# Patient Record
Sex: FEMALE | Race: WHITE
[De-identification: names, ages, dates, MRNs, and addresses within clinical notes are randomized per-mention and may not be internally consistent; named-entity substitution may affect disease eponyms.]

---

## 2017-04-27 ENCOUNTER — HOSPITAL ENCOUNTER (INPATIENT)
Dept: HOSPITAL 4 - SED | Age: 57
LOS: 1 days | Discharge: ANOTHER HOSPITAL | DRG: 53 | End: 2017-04-28
Payer: MEDICAID

## 2017-04-27 VITALS
OXYGEN SATURATION: 99 % | SYSTOLIC BLOOD PRESSURE: 158 MMHG | BODY MASS INDEX: 20.89 KG/M2 | RESPIRATION RATE: 17 BRPM | HEART RATE: 100 BPM | TEMPERATURE: 97.9 F | HEIGHT: 66 IN | DIASTOLIC BLOOD PRESSURE: 88 MMHG | WEIGHT: 130 LBS

## 2017-04-27 VITALS
TEMPERATURE: 99.4 F | DIASTOLIC BLOOD PRESSURE: 81 MMHG | RESPIRATION RATE: 18 BRPM | HEART RATE: 124 BPM | OXYGEN SATURATION: 95 % | SYSTOLIC BLOOD PRESSURE: 134 MMHG

## 2017-04-27 VITALS
OXYGEN SATURATION: 97 % | RESPIRATION RATE: 16 BRPM | HEART RATE: 110 BPM | DIASTOLIC BLOOD PRESSURE: 93 MMHG | TEMPERATURE: 98.9 F | SYSTOLIC BLOOD PRESSURE: 156 MMHG

## 2017-04-27 DIAGNOSIS — Z91.14: ICD-10-CM
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
## 2017-04-27 DIAGNOSIS — G40.909: Primary | ICD-10-CM
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
## 2017-04-27 DIAGNOSIS — E87.1: ICD-10-CM
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
## 2017-04-27 DIAGNOSIS — M25.422: ICD-10-CM

## 2017-04-27 DIAGNOSIS — S52.122A: ICD-10-CM

## 2017-04-27 DIAGNOSIS — Z79.899: ICD-10-CM

## 2017-04-27 DIAGNOSIS — I10: ICD-10-CM

## 2017-04-27 LAB
ALBUMIN SERPL BCP-MCNC: 4.3 G/DL (ref 3.4–4.8)
ALT SERPL W P-5'-P-CCNC: 24 U/L (ref 12–78)
ANION GAP SERPL CALCULATED.3IONS-SCNC: 3 MMOL/L (ref 5–15)
APPEARANCE UR: CLEAR
AST SERPL W P-5'-P-CCNC: 24 U/L (ref 10–37)
BASOPHILS # BLD AUTO: 0 K/UL (ref 0–0.2)
BASOPHILS NFR BLD AUTO: 0.2 % (ref 0–2)
BILIRUB SERPL-MCNC: 0.3 MG/DL (ref 0–1)
BILIRUB UR QL STRIP: NEGATIVE
BUN SERPL-MCNC: 8 MG/DL (ref 8–21)
CALCIUM SERPL-MCNC: 8.7 MG/DL (ref 8.4–11)
CARBAMAZEPINE SERPL-MCNC: 10 UG/ML (ref 4–12)
CHLORIDE SERPL-SCNC: 91 MMOL/L (ref 98–107)
COLOR UR: YELLOW
CREAT SERPL-MCNC: 0.69 MG/DL (ref 0.55–1.3)
EOSINOPHIL # BLD AUTO: 0 K/UL (ref 0–0.4)
EOSINOPHIL NFR BLD AUTO: 0.1 % (ref 0–4)
ERYTHROCYTE [DISTWIDTH] IN BLOOD BY AUTOMATED COUNT: 12.1 % (ref 9–15)
GFR SERPL CREATININE-BSD FRML MDRD: 113 ML/MIN (ref 90–?)
GLUCOSE SERPL-MCNC: 111 MG/DL (ref 70–99)
GLUCOSE UR STRIP-MCNC: NEGATIVE MG/DL
HCT VFR BLD AUTO: 41.4 % (ref 36–48)
HGB BLD-MCNC: 14.1 G/DL (ref 12–16)
HGB UR QL STRIP: NEGATIVE
INR PPP: 1 (ref 0.8–1.2)
KETONES UR STRIP-MCNC: NEGATIVE MG/DL
LEUKOCYTE ESTERASE UR QL STRIP: NEGATIVE
LYMPHOCYTES # BLD AUTO: 0.5 K/UL (ref 1–5.5)
LYMPHOCYTES NFR BLD AUTO: 7.4 % (ref 20.5–51.5)
MCH RBC QN AUTO: 31 PG (ref 27–31)
MCHC RBC AUTO-ENTMCNC: 34 % (ref 32–36)
MCV RBC AUTO: 91 FL (ref 79–98)
MONOCYTES # BLD MANUAL: 0.1 K/UL (ref 0–1)
MONOCYTES # BLD MANUAL: 1.3 % (ref 1.7–9.3)
NEUTROPHILS # BLD AUTO: 5.9 K/UL (ref 1.8–7.7)
NEUTROPHILS NFR BLD AUTO: 91 % (ref 40–70)
NITRITE UR QL STRIP: NEGATIVE
PH UR STRIP: 8 [PH] (ref 5–8)
PHENYTOIN SERPL-MCNC: < 0.5 UG/ML (ref 10–20)
PLATELET # BLD AUTO: 212 K/UL (ref 130–430)
POTASSIUM SERPL-SCNC: 4 MMOL/L (ref 3.5–5.1)
PROT SERPL-MCNC: 7.6 G/DL (ref 6.4–8.3)
PROT UR QL STRIP: NEGATIVE
PROTHROMBIN TIME: 10.9 SECS (ref 9.5–12.5)
RBC # BLD AUTO: 4.55 MIL/UL (ref 4.2–6.2)
SODIUM SERPLBLD-SCNC: 124 MMOL/L (ref 136–145)
SP GR UR STRIP: 1.01 (ref 1–1.03)
UROBILINOGEN UR STRIP-MCNC: 0.2 MG/DL (ref 0.2–1)
WBC # BLD AUTO: 6.5 K/UL (ref 4.8–10.8)

## 2017-04-27 RX ADMIN — PHENYTOIN SODIUM SCH MG: 100 CAPSULE, EXTENDED RELEASE ORAL at 21:20

## 2017-04-27 RX ADMIN — SODIUM CHLORIDE SCH MLS/HR: 9 INJECTION, SOLUTION INTRAVENOUS at 12:21

## 2017-04-27 RX ADMIN — HEPARIN SODIUM SCH UNITS: 5000 INJECTION, SOLUTION INTRAVENOUS; SUBCUTANEOUS at 21:21

## 2017-04-27 NOTE — NUR
PATIENT HAD AN EPISODE OF SEIZURE. PATIENT BECOMES STIFF; EYES REMAINS REOPEN, BUT UNABLE TO 
RESPOND VERBALLY. SZ PADS ARE ON. ATIVAN 2MG IVP IS GIVEN, WILL REASSESS.

## 2017-04-27 NOTE — NUR
-------------------------------------------------------------------------------

           *** Note undone in EDM - 04/29/17 at 0711 by JUANCARLOS ***            

-------------------------------------------------------------------------------

Patient to be transferred to Tele Unit room 132 C.  Is being transferred due to 
higher level of care.  Receiving facility has accepting physician and available 
space. ER physician has signed transfer form.  Patient or responsible party has 
agreed to transfer and signed form.  Patient belongings inventoried and will be 
sent with patient.  Copy of nursing notes, lab reports, EKG, Physicians Orders 
and X-rays to be sent with patient.  Report called to MYESHA Sen at receiving 
facility. Receiving physician is Dr. Hoffman Summary report printed. Report given 
at bedside, paperwork provided to admitting nurse.

## 2017-04-27 NOTE — NUR
INITIAL NOTE

Patient resting on the bed. No acute distress. Respiration even and unlabored. Denied of 
pain. Skin warm and dry to touch. IV intact to LAC, no redness, no swelling, no drainage. On 
NS at 70ml/hr, infusing well. Sling to left elbow in placed. Discussed the safety issue, use 
call light when need help, and plan of care, verbally understanding. Safety measure 
maintained. Call light within reached. Bed in low position, padded side rails up, bed alarm 
on. Bedside commode provided at bedside. Will continue to monitor.

## 2017-04-27 NOTE — NUR
PATIENT IS RESTING, BREATHING UNLABORED, ST ON MONITOR. NO SIGNS OF APPARENT SZ NOTED AT 
THIS TIME. WILL CONTINUE TO MONITOR.

## 2017-04-27 NOTE — NUR
Medication reconciliation completed with information provided by Rx bottles 
from patient. Any prior medication reconciliation on file was reviewed and 
corrected. Patient's home medication will be sent to Pharmacy. Pharm will come 
to  medication.

## 2017-04-27 NOTE — NUR
Placed in room 02. Placed on cardiac monitor, blood pressure machine and pulse 
oximeter. To gown for exam. Side rails up.

Report given to MYESHA Pedroza.

## 2017-04-27 NOTE — NUR
RECEIVED THE CALL BACK FROM [facility]

Received the call back from [facility] Reina. Gave the patient's name, birthday, and 
diagnosis. However, according to Reina she does not have the access to check the insurance 
and she can only deal with ER patient not the floor patient. Should be call in the morning 
with Petr Campos, Phone #[phone]. Will endorse to morning shift nurse.

## 2017-04-27 NOTE — NUR
DC planning: Call [facility] at [phone] if after 5:30pm for transfer if cleared by 
neurologist tonight--Use [phone] or [facility] [phone] 
ACLS--auth#[id number] for transport--ortho will be followed up upon at contracted 
hospital once transferred (Most likely [facility] as they have the ortho MD there 
)DOMENIC BRUNNER

## 2017-04-27 NOTE — NUR
ADMIT NOTE

Received pt from ER to the floor with a diagnosis of seizures. Admission process initiated. 
patient oriented to pain management, safety and call light-teach back done.

## 2017-04-27 NOTE — NUR
CALLED [facility]

Called [facility] spoke with Reina regarding transfer but she said that she is busy 
and will call me back.

## 2017-04-27 NOTE — NUR
Patient BIB ambulance for a witnessed fall as she was walking on the side 
walk.Per witnessness, patient had a convulsion, then fell to the ground, head 
did not hit the ground. Patient is alert and oriented x 3, with no memory 
recall of convulsion and fall.She is able to follow commands and make her needs 
known.Superficial abrasion to left elbow noted.Further, she complains of pain 
on flexsion of left elbow.No other complaints, injury per patient, none noted.

## 2017-04-27 NOTE — NUR
DC planning: S/W Kayley at [facility]--and Dr. Pena regarding neuro/ortho 
needs--Dr. Pena agrees and gave order for transfer to telemetry bed at contracted hospital 
once cleared by neurologist--pt had seizure about 1.5 hrs ago per nurse Sen and Kaylye at 
[facility] made aware. All requested information and transfer order faxed to [facility] to 
fax#[phone] and [phone]--calling luis carlos Campos assigned at [facility] at [phone] to 
discuss transfer to contracted facility--Nurse Sen updated--DOMENIC BRUNNER

## 2017-04-27 NOTE — NUR
CALLED DR. DE LOS SANTOS [facility] REGARDING TRANSFER

Informed Dr. De Los Santos regarding transfer patient already approved by Dr. Funes for transfer but 
still not get the contact with the insurance. Dr. De Los Santos stated "Don't worry about that just 
transfer tomorrow."

## 2017-04-27 NOTE — NUR
Patient will be admitted to care of Dr Pena.  Admitted to tele unit.  Will go 
to room 132C.  Belongings list completed.  Summary report printed. Report will 
be given at bedside. Transfer to tele via ACLS protocol. Licensed nurse 
present. IV present no signs or symptoms of infiltration.

## 2017-04-27 NOTE — NUR
LUPE planning-Per Lashae in admitting, Mari will be the [facility] --I Left voice 
mail for Mari at [facility] [phone] to inquire about authorization 
specific for ortho consult-will f/u with Mari--Nurse Mckinley murillo. DOMENIC BRUNNER

-------------------------------------------------------------------------------

Addendum: 04/27/17 at 1510 by Mena Weir RN

-------------------------------------------------------------------------------

Also left message with luis carlos Greene at [facility] [phone] to call me back regarding auth for 
ortho consult

## 2017-04-27 NOTE — NUR
PATIENT ATTEMPTS TO GET OUT OF BED. RN ATTEMPTED TO REORIENT PATIENT, AND INSTRUCT PATIENT 
TO USE CALL LIGHT FOR NEEDS. CALL LIGHT IS IN PLACE, BED LOCKED AT LOWEST POSITION, WILL 
CONTINUE TO MONITOR CLOSELY.

## 2017-04-27 NOTE — NUR
ROUND

Patient resting on the bed with eyes closed. No acute distress. Sling in placed to left 
elbow. Safety measure maintained. Call light within reached. Will continue to monitor.

## 2017-04-28 VITALS
HEART RATE: 104 BPM | DIASTOLIC BLOOD PRESSURE: 87 MMHG | TEMPERATURE: 97.6 F | SYSTOLIC BLOOD PRESSURE: 149 MMHG | RESPIRATION RATE: 18 BRPM | OXYGEN SATURATION: 98 %

## 2017-04-28 VITALS
OXYGEN SATURATION: 97 % | RESPIRATION RATE: 16 BRPM | SYSTOLIC BLOOD PRESSURE: 143 MMHG | TEMPERATURE: 97.4 F | DIASTOLIC BLOOD PRESSURE: 95 MMHG | HEART RATE: 107 BPM

## 2017-04-28 VITALS
TEMPERATURE: 97.7 F | SYSTOLIC BLOOD PRESSURE: 130 MMHG | OXYGEN SATURATION: 97 % | DIASTOLIC BLOOD PRESSURE: 86 MMHG | RESPIRATION RATE: 16 BRPM | HEART RATE: 79 BPM

## 2017-04-28 VITALS
SYSTOLIC BLOOD PRESSURE: 140 MMHG | HEART RATE: 110 BPM | TEMPERATURE: 98.1 F | RESPIRATION RATE: 18 BRPM | DIASTOLIC BLOOD PRESSURE: 91 MMHG

## 2017-04-28 VITALS
TEMPERATURE: 97.6 F | OXYGEN SATURATION: 98 % | RESPIRATION RATE: 18 BRPM | SYSTOLIC BLOOD PRESSURE: 128 MMHG | DIASTOLIC BLOOD PRESSURE: 86 MMHG | HEART RATE: 86 BPM

## 2017-04-28 VITALS
OXYGEN SATURATION: 97 % | TEMPERATURE: 99.2 F | SYSTOLIC BLOOD PRESSURE: 124 MMHG | RESPIRATION RATE: 18 BRPM | HEART RATE: 106 BPM | DIASTOLIC BLOOD PRESSURE: 73 MMHG

## 2017-04-28 LAB
ANION GAP SERPL CALCULATED.3IONS-SCNC: 5 MMOL/L (ref 5–15)
BASOPHILS # BLD AUTO: 0 K/UL (ref 0–0.2)
BASOPHILS NFR BLD AUTO: 0.2 % (ref 0–2)
BUN SERPL-MCNC: 7 MG/DL (ref 8–21)
CALCIUM SERPL-MCNC: 8.2 MG/DL (ref 8.4–11)
CHLORIDE SERPL-SCNC: 97 MMOL/L (ref 98–107)
CREAT SERPL-MCNC: 0.67 MG/DL (ref 0.55–1.3)
EOSINOPHIL # BLD AUTO: 0 K/UL (ref 0–0.4)
EOSINOPHIL NFR BLD AUTO: 0.3 % (ref 0–4)
ERYTHROCYTE [DISTWIDTH] IN BLOOD BY AUTOMATED COUNT: 12.2 % (ref 9–15)
GFR SERPL CREATININE-BSD FRML MDRD: 117 ML/MIN (ref 90–?)
GLUCOSE SERPL-MCNC: 109 MG/DL (ref 70–99)
HCT VFR BLD AUTO: 38.5 % (ref 36–48)
HGB BLD-MCNC: 13.4 G/DL (ref 12–16)
LYMPHOCYTES # BLD AUTO: 1.4 K/UL (ref 1–5.5)
LYMPHOCYTES NFR BLD AUTO: 14.9 % (ref 20.5–51.5)
MCH RBC QN AUTO: 32 PG (ref 27–31)
MCHC RBC AUTO-ENTMCNC: 35 % (ref 32–36)
MCV RBC AUTO: 93 FL (ref 79–98)
MONOCYTES # BLD MANUAL: 0.7 K/UL (ref 0–1)
MONOCYTES # BLD MANUAL: 7.6 % (ref 1.7–9.3)
NEUTROPHILS # BLD AUTO: 7.3 K/UL (ref 1.8–7.7)
NEUTROPHILS NFR BLD AUTO: 77 % (ref 40–70)
PLATELET # BLD AUTO: 201 K/UL (ref 130–430)
POTASSIUM SERPL-SCNC: 3.6 MMOL/L (ref 3.5–5.1)
RBC # BLD AUTO: 4.16 MIL/UL (ref 4.2–6.2)
SODIUM SERPLBLD-SCNC: 130 MMOL/L (ref 136–145)
WBC # BLD AUTO: 9.4 K/UL (ref 4.8–10.8)

## 2017-04-28 RX ADMIN — HEPARIN SODIUM SCH UNITS: 5000 INJECTION, SOLUTION INTRAVENOUS; SUBCUTANEOUS at 09:28

## 2017-04-28 RX ADMIN — PHENYTOIN SODIUM SCH MG: 100 CAPSULE, EXTENDED RELEASE ORAL at 09:26

## 2017-04-28 RX ADMIN — SODIUM CHLORIDE SCH MLS/HR: 9 INJECTION, SOLUTION INTRAVENOUS at 05:10

## 2017-04-28 NOTE — NUR
PT MEDICATED AS ORDERED. PT SIGNED TRANSFER AUTHORIZATION TO [facility]. DRESSED IN 
TRANSFER GOWN, ETC.

## 2017-04-28 NOTE — NUR
DC planning: rec'd vm from cm Beth at [facility]--Dr. Jimy Doran accepting to [facility] rm#[id number]--Address [address]  ,[place]--call report to 
[phone]--[facility] ambulance BLS will  pt at 1600 today--will update nurse Joanna--DOMENIC BRUNNER

-------------------------------------------------------------------------------

Addendum: 04/28/17 at 1422 by Mena Weir RN

-------------------------------------------------------------------------------

Rec'd vm from Beth at [facility]-their MD Dr. Jimy Hemphill s/w our MD and decided it best to keep pt 
on telemetry due to breakthrough seizures. [facility] notified and they have changed it to 
telemetry transport--waiting to hear back from Dr. Pena regarding tele status here. DOMENIC BRUNNER

## 2017-04-28 NOTE — NUR
DC planning: Per Dr. Pena, pt cleared by neurology for transfer to contracted 
hospital-med/surg bed--I called Beth at [facility] [phone] and left message that pt cleared 
for transfer to med/surg bed--I faxed her new order to fax#[phone] RN

## 2017-04-28 NOTE — NUR
ROUND

Patient getting out of bed without assistance. Assisted patient to use bedside commode. 
Reminded patient to use call light when need to get out of bed, verbally understanding. No 
acute distress. Respiration even and unlabored. Sling in placed to left elbow. Safety 
measure maintained. Bed in low position, padded side rails up, bed alarm on. Call light 
within reached. Continue to monitor.

## 2017-04-28 NOTE — NUR
ROUND

Patient resting on the bed comfortable. No acute distress. Respiration even and unlabored. 
Sling in placed to left elbow. Safety measure maintained. Bed in low position, padded side 
rails up, bed alarm on. Call light within reached. Continue to monitor.

## 2017-04-28 NOTE — NUR
PATIENT GIVEN LUNCH TRAY. STATES SHE IS NOT HUNGRY. PT CONTINUES TO GET UP OUT OF HER BED 
STATING SHE NEEDS TO GO HOME. PT HAS CALLED HER SISTER FROM HER CELL PHONE MULTIPLE TIMES. 
IS NOW IN ROOM 121C WITH SITTER.

## 2017-04-28 NOTE — NUR
ROUND

Patient resting on the bed with eyes closed. No acute distress. Respiration even and 
unlabored. Sling in placed to left elbow. Safety measure maintained. Bed in low position, 
padded side rails up, bed alarm on. Call light within reached. Continue to monitor.

## 2017-04-28 NOTE — NUR
PATIENT UP OUT OF BED AGAIN, PULLED IV OUT OF HER ARM AND IS ASKING TO GO HOME. REORIENTED 
PT AND EXPLAINED THAT SHE HAS PLANS TO TRANSFER TO ANOTHER HOSPITAL. PATIENT GOT BACK IN BED

## 2017-04-28 NOTE — NUR
REPORT CALLED TO SARAH AT [facility]. PT TO BE ON TELE FOR TRANSFER AND AT 
ADMIT.

## 2017-04-28 NOTE — NUR
DC planning: Rec'd call from Beth at [facility]--She said Dr. Jimy Mclean will accept at [facility] and is calling Dr. Pena--use new auth#Use [facility] [phone] or [facility] 
ambulance [phone] [facility]--auth#[id number] for transport-DH RN

## 2017-04-28 NOTE — NUR
PATIENT TAKEN VIA AMBULANCE TO [facility]. REPORT GIVEN TO MEDICS AT BEDSIDE. 
PATIENT PLACED ON GURNEY, ALL BELONGINGS ACCOUNTED FOR AND TAKEN WITH PATIENT ON TRANSPORT. 
PLACED ON MONITOR FOR TRANSPORT. VITALS STABLE, HR ST. NO ACUTE DISTRESS.

## 2017-04-28 NOTE — NUR
CLOSING NOTE

Patient resting on the bed. No acute distress. Respiration even and unlabored. Denied of 
pain. Skin warm and dry to touch. IV intact to LAC, no redness, no swelling, no drainage. On 
NS at 70ml/hr, infusing well. Sling to left elbow in placed. No episode of seizure activity 
noted during shift. All needs met. Hourly rounding during shift. Safety measure maintained. 
Call light within reached. Bed in low position, padded side rails up, bed alarm on. Bedside 
commode provided at bedside. Will endorse to morning shift nurse.

## 2017-04-28 NOTE — NUR
OPENING NOTE

REPORT RECEIVED FROM MYESHA CABRAL. PATIENT IS AWAKE, ALERT VERY CONFUSED. BELIEVES SHE IS IN A 
HOTEL.

## 2017-04-28 NOTE — NUR
PATIENT NOTED TO BE OUT OF BED WITH HAT ON AND WALKING WITH BELONGINGS STATING HER SISTER IS 
SOMEWHERE IN THE BUILDING AND SHE NEEDS TO FIND HER. REORIENTED PT AND PLACED BACK IN BED

## 2017-08-15 ENCOUNTER — HOSPITAL ENCOUNTER (EMERGENCY)
Dept: HOSPITAL 4 - SED | Age: 57
Discharge: LEFT BEFORE DISCHARGE | End: 2017-08-15
Payer: MEDICAID

## 2017-08-15 VITALS — HEIGHT: 62 IN | BODY MASS INDEX: 22.08 KG/M2 | WEIGHT: 120 LBS

## 2017-08-15 VITALS — SYSTOLIC BLOOD PRESSURE: 130 MMHG

## 2017-08-15 DIAGNOSIS — R56.9: Primary | ICD-10-CM

## 2017-08-15 LAB
ALBUMIN SERPL BCP-MCNC: 3.9 G/DL (ref 3.4–4.8)
ALT SERPL W P-5'-P-CCNC: 18 U/L (ref 12–78)
ANION GAP SERPL CALCULATED.3IONS-SCNC: 4 MMOL/L (ref 5–15)
APAP SERPL-MCNC: < 1 UG/ML (ref 1–30)
AST SERPL W P-5'-P-CCNC: 19 U/L (ref 10–37)
BASOPHILS # BLD AUTO: 0 K/UL (ref 0–0.2)
BASOPHILS NFR BLD AUTO: 0.4 % (ref 0–2)
BILIRUB SERPL-MCNC: 0.2 MG/DL (ref 0–1)
BUN SERPL-MCNC: 14 MG/DL (ref 8–21)
CALCIUM SERPL-MCNC: 8.9 MG/DL (ref 8.4–11)
CARBAMAZEPINE SERPL-MCNC: 9 UG/ML (ref 4–12)
CHLORIDE SERPL-SCNC: 92 MMOL/L (ref 98–107)
CK SERPL-CCNC: 80 U/L (ref 26–192)
CREAT SERPL-MCNC: 0.62 MG/DL (ref 0.55–1.3)
EOSINOPHIL # BLD AUTO: 0.1 K/UL (ref 0–0.4)
EOSINOPHIL NFR BLD AUTO: 1.6 % (ref 0–4)
ERYTHROCYTE [DISTWIDTH] IN BLOOD BY AUTOMATED COUNT: 12.1 % (ref 9–15)
ETHANOL SERPL-MCNC: < 3 MG/DL (ref ?–10)
GFR SERPL CREATININE-BSD FRML MDRD: 128 ML/MIN (ref 90–?)
GLUCOSE SERPL-MCNC: 107 MG/DL (ref 70–99)
HCT VFR BLD AUTO: 37.6 % (ref 36–48)
HGB BLD-MCNC: 12.7 G/DL (ref 12–16)
INR PPP: 1 (ref 0.8–1.2)
LYMPHOCYTES # BLD AUTO: 1.3 K/UL (ref 1–5.5)
LYMPHOCYTES NFR BLD AUTO: 22.3 % (ref 20.5–51.5)
MCH RBC QN AUTO: 31 PG (ref 27–31)
MCHC RBC AUTO-ENTMCNC: 34 % (ref 32–36)
MCV RBC AUTO: 92 FL (ref 79–98)
MONOCYTES # BLD MANUAL: 0.5 K/UL (ref 0–1)
MONOCYTES # BLD MANUAL: 8.6 % (ref 1.7–9.3)
NEUTROPHILS # BLD AUTO: 3.7 K/UL (ref 1.8–7.7)
NEUTROPHILS NFR BLD AUTO: 67.1 % (ref 40–70)
PHENYTOIN SERPL-MCNC: < 0.5 UG/ML (ref 10–20)
PLATELET # BLD AUTO: 203 K/UL (ref 130–430)
POTASSIUM SERPL-SCNC: 4.8 MMOL/L (ref 3.5–5.1)
PROT SERPL-MCNC: 7.2 G/DL (ref 6.4–8.3)
PROTHROMBIN TIME: 10.4 SECS (ref 9.5–12.5)
RBC # BLD AUTO: 4.07 MIL/UL (ref 4.2–6.2)
SALICYLATES SERPL-MCNC: 1 MG/DL (ref 3–30)
SODIUM SERPLBLD-SCNC: 126 MMOL/L (ref 136–145)
WBC # BLD AUTO: 5.6 K/UL (ref 4.8–10.8)

## 2017-08-15 PROCEDURE — 85610 PROTHROMBIN TIME: CPT

## 2017-08-15 PROCEDURE — 80185 ASSAY OF PHENYTOIN TOTAL: CPT

## 2017-08-15 PROCEDURE — G0481 DRUG TEST DEF 8-14 CLASSES: HCPCS

## 2017-08-15 PROCEDURE — 36415 COLL VENOUS BLD VENIPUNCTURE: CPT

## 2017-08-15 PROCEDURE — 99284 EMERGENCY DEPT VISIT MOD MDM: CPT

## 2017-08-15 PROCEDURE — 85025 COMPLETE CBC W/AUTO DIFF WBC: CPT

## 2017-08-15 PROCEDURE — 82550 ASSAY OF CK (CPK): CPT

## 2017-08-15 PROCEDURE — G0482 DRUG TEST DEF 15-21 CLASSES: HCPCS

## 2017-08-15 PROCEDURE — 85730 THROMBOPLASTIN TIME PARTIAL: CPT

## 2017-08-15 PROCEDURE — 80053 COMPREHEN METABOLIC PANEL: CPT

## 2017-08-15 PROCEDURE — G0480 DRUG TEST DEF 1-7 CLASSES: HCPCS

## 2017-08-15 PROCEDURE — 80156 ASSAY CARBAMAZEPINE TOTAL: CPT

## 2017-08-15 PROCEDURE — 84484 ASSAY OF TROPONIN QUANT: CPT

## 2018-01-02 ENCOUNTER — HOSPITAL ENCOUNTER (INPATIENT)
Dept: HOSPITAL 36 - ER | Age: 58
LOS: 1 days | Discharge: HOME | DRG: 53 | End: 2018-01-03
Attending: INTERNAL MEDICINE | Admitting: INTERNAL MEDICINE
Payer: MEDICAID

## 2018-01-02 DIAGNOSIS — E87.1: ICD-10-CM

## 2018-01-02 DIAGNOSIS — G40.909: Primary | ICD-10-CM

## 2018-01-02 LAB
ALBUMIN SERPL-MCNC: 4.5 GM/DL (ref 3.7–5.3)
ALBUMIN/GLOB SERPL: 2 {RATIO} (ref 1–1.8)
ALP SERPL-CCNC: 132 U/L (ref 34–104)
ALT SERPL-CCNC: 12 U/L (ref 7–52)
ANION GAP SERPL CALC-SCNC: 10.1 MMOL/L (ref 7–16)
APPEARANCE UR: CLEAR
AST SERPL-CCNC: 19 U/L (ref 13–39)
BACTERIA #/AREA URNS HPF: (no result) /HPF
BASOPHILS # BLD AUTO: 0 TH/CUMM (ref 0–0.2)
BILIRUB SERPL-MCNC: 0.2 MG/DL (ref 0.3–1)
BILIRUB UR-MCNC: NEGATIVE MG/DL
BUN SERPL-MCNC: 14 MG/DL (ref 7–25)
CALCIUM SERPL-MCNC: 9.1 MG/DL (ref 8.6–10.3)
CHLORIDE SERPL-SCNC: 100 MEQ/L (ref 98–107)
CO2 SERPL-SCNC: 25.9 MEQ/L (ref 21–31)
COLOR UR: YELLOW
CREAT SERPL-MCNC: 0.7 MG/DL (ref 0.6–1.2)
EOSINOPHIL # BLD AUTO: 0 TH/CMM (ref 0.1–0.4)
EPI CELLS URNS QL MICRO: (no result) /LPF
ERYTHROCYTE [DISTWIDTH] IN BLOOD BY AUTOMATED COUNT: 12 % (ref 11.5–20)
GLOBULIN SER-MCNC: 2.3 GM/DL
GLUCOSE SERPL-MCNC: 117 MG/DL (ref 70–105)
GLUCOSE UR STRIP-MCNC: NEGATIVE MG/DL
HCT VFR BLD CALC: 38.5 % (ref 41–60)
HGB BLD-MCNC: 13.1 GM/DL (ref 12–16)
KETONES UR STRIP-MCNC: NEGATIVE MG/DL
LEUKOCYTE ESTERASE UR-ACNC: NEGATIVE
LYMPHOCYTE AB SER FC-ACNC: 0.6 TH/CMM (ref 1.5–3)
MANUAL DIFFERENTIAL PERFORMED BLD QL: YES
MCH RBC QN AUTO: 31.1 PG (ref 27–31)
MCHC RBC AUTO-ENTMCNC: 33.9 PG (ref 28–36)
MCV RBC AUTO: 91.8 FL (ref 81–100)
MICRO URNS: YES
MONOCYTES # BLD AUTO: 0.3 TH/CMM (ref 0.3–1)
NEUTROPHILS # BLD: 9.9 TH/CMM (ref 1.8–8)
NITRITE UR QL STRIP: NEGATIVE
PH UR STRIP: 7 [PH] (ref 4.6–8)
PLATELET # BLD: 264 TH/CMM (ref 150–400)
PMV BLD AUTO: 7 FL
POTASSIUM SERPL-SCNC: 4 MEQ/L (ref 3.5–5.1)
PROT UR STRIP-MCNC: NEGATIVE MG/DL
RBC # BLD AUTO: 4.2 MIL/CMM (ref 3.8–5.1)
RBC # UR STRIP: (no result) /UL
RBC #/AREA URNS HPF: (no result) /HPF (ref 0–5)
SODIUM SERPL-SCNC: 132 MEQ/L (ref 136–145)
SP GR UR STRIP: 1.01 (ref 1–1.03)
URINALYSIS COMPLETE PNL UR: (no result)
UROBILINOGEN UR STRIP-ACNC: 0.2 E.U./DL (ref 0.2–1)
WBC # BLD AUTO: 10.8 TH/CMM (ref 4.8–10.8)
WBC #/AREA URNS HPF: (no result) /HPF (ref 0–5)

## 2018-01-02 PROCEDURE — Z7610: HCPCS

## 2018-01-02 NOTE — ED PHYSICIAN CHART
ED Chief Complaint/HPI





- Patient Information


Date Seen:: 01/02/18


Time Seen:: 19:18


Chief Complaint:: Seizures


History of Present Illness:: 


58 yo female was found to have seizures on bus and was brought to ER by 

ambulance. The patient stated that she had seizures since childhood. She did 

not recall name and dose of anti-seizure medication. The patient had one 

episode of tremor of whole body, loss of consciousness for 1 minute at ER


Allergies:: 


 Allergies











Allergy/AdvReac Type Severity Reaction Status Date / Time


 


UNOBTN - Unobtainable Allergy   Verified 01/02/18 18:50











Vitals:: 


 Vital Signs - 8 hr











  01/02/18





  18:51


 


Temp 100.1 F


 





 


RR 16


 


/83


 


O2 Sat % 97














ED Past Medical History





- Past Medical History


Past Medical History: Seizures


Social History: Non Smoker, No Alcohol, No Drug Use, Lives Alone


Surgical History: None





Family Medical History





- Family Member


  ** Mother


Ethnicity: 





ED Septic Shock





- <6hrs of presentation:


Vital Signs: 


 Vital Signs - 8 hr











  01/02/18





  18:51


 


Temp 100.1 F


 





 


RR 16


 


/83


 


O2 Sat % 97

## 2018-01-03 VITALS — DIASTOLIC BLOOD PRESSURE: 86 MMHG | SYSTOLIC BLOOD PRESSURE: 129 MMHG

## 2018-01-03 LAB
ANION GAP SERPL CALC-SCNC: 9 MMOL/L (ref 7–16)
BASOPHILS # BLD AUTO: 0 TH/CUMM (ref 0–0.2)
BASOPHILS NFR BLD AUTO: 0.6 % (ref 0–2)
BUN SERPL-MCNC: 9 MG/DL (ref 7–25)
CALCIUM SERPL-MCNC: 8.7 MG/DL (ref 8.6–10.3)
CHLORIDE SERPL-SCNC: 100 MEQ/L (ref 98–107)
CO2 SERPL-SCNC: 28.5 MEQ/L (ref 21–31)
CREAT SERPL-MCNC: 0.6 MG/DL (ref 0.6–1.2)
EOSINOPHIL # BLD AUTO: 0 TH/CMM (ref 0.1–0.4)
EOSINOPHIL NFR BLD AUTO: 0.4 % (ref 0–5)
ERYTHROCYTE [DISTWIDTH] IN BLOOD BY AUTOMATED COUNT: 12 % (ref 11.5–20)
GLUCOSE SERPL-MCNC: 112 MG/DL (ref 70–105)
HCT VFR BLD CALC: 36.7 % (ref 41–60)
HGB BLD-MCNC: 12.9 GM/DL (ref 12–16)
LYMPHOCYTE AB SER FC-ACNC: 1 TH/CMM (ref 1.5–3)
LYMPHOCYTES NFR BLD AUTO: 12.5 % (ref 20–50)
MCH RBC QN AUTO: 31.9 PG (ref 27–31)
MCHC RBC AUTO-ENTMCNC: 35.1 PG (ref 28–36)
MCV RBC AUTO: 90.7 FL (ref 81–100)
MONOCYTES # BLD AUTO: 0.4 TH/CMM (ref 0.3–1)
MONOCYTES NFR BLD AUTO: 5.1 % (ref 2–10)
NEUTROPHILS # BLD: 6.3 TH/CMM (ref 1.8–8)
NEUTROPHILS NFR BLD AUTO: 81.4 % (ref 40–80)
PLATELET # BLD: 267 TH/CMM (ref 150–400)
PMV BLD AUTO: 6.9 FL
POTASSIUM SERPL-SCNC: 3.5 MEQ/L (ref 3.5–5.1)
RBC # BLD AUTO: 4.05 MIL/CMM (ref 3.8–5.1)
SODIUM SERPL-SCNC: 134 MEQ/L (ref 136–145)
WBC # BLD AUTO: 7.7 TH/CMM (ref 4.8–10.8)

## 2018-01-03 NOTE — CONSULTATION
DATE OF CONSULTATION:  01/03/2018



NEUROLOGY CONSULTATION



HISTORY OF PRESENT ILLNESS:  The patient is a 57-year-old.  The patient had

seizure while she was on the bus.  The patient was brought here.  The patient

had seizure since childhood.  She is on medication, follows with a neurologist,

and she does not know the name of the medication.  At the moment, the patient is

awake, alert, no other problems.  No headache.  No tongue biting.



PAST MEDICAL HISTORY:  Seizure.



SOCIAL HISTORY:  Does not smoke or drink.  No drugs.



REVIEW OF SYSTEMS:  On direct questioning from the patient, no headache.  Speech

is okay.  Moving all extremities.



PHYSICAL EXAMINATION:

VITAL SIGNS:  Temperature 98.4, blood pressure 150/80, pulse is 92.

NECK:  Supple, no bruits.

HEART:  Sounds S1 and S2.

LUNGS:  Clear.

NEUROLOGIC:  The patient is awake, alert.  Speech is normal.  She knew what day,

what month, what year.

CRANIAL:  Pupils reactive to light.  Full eye movement.  No nystagmus.  No

facial weakness.

MOTOR:  She will lift both arms up.  She will lift both legs up.  Reflexes are

about 1-2+.



INVESTIGATIONS:  WBC 7.7, hemoglobin 12.9, platelets normal.  Sodium 132.



LABORATORY DATA:  Carbamazepine is 8.7, so I suspect she probably takes

Tegretol.



ASSESSMENT:  Seizures.



PLAN:  The patient at this time will continue with the carbamazepine.  Level

seems to be okay.  I am also going to add in Keppra.





DD: 01/03/2018 07:34

DT: 01/03/2018 07:52

JOB# [id number]  [id number]

## 2018-01-03 NOTE — DIAGNOSTIC IMAGING REPORT
Head CT without intravenous contrast



Indication: Seizures



Comparison: None 



Technique: Axial images were obtained from the vertex to the skull base

without IV contrast. Coronal reconstructions were made.   Total DLP:

617,  [id number]





FINDINGS:



Images of the brain obtained without contrast demonstrate no acute

hemorrhage. No mass lesions identified. The ventricles and basal

cisterns are patent. Atrophy is noted.  The gray-white matter

differentiation is preserved. There is no mass effect or midline shift.



No skull fractures identified. There is a 2 cm calcified nodule of the

frontal scalp. The visualized paranasal sinuses demonstrate mild mucosal

thickening.



IMPRESSION:



No evidence of acute intracranial hemorrhage



Atrophy.



2 cm nodule right frontal scalp, possibly a sebaceous cyst.



Mucosal thickening of the paranasal sinuses, greatest within the right

maxillary sinus.

## 2018-01-03 NOTE — HISTORY & PHYSICAL PRE-OP
DATE OF SERVICE:     



CHIEF COMPLAINT:  Seizure activity.



HISTORY OF PRESENT ILLNESS:  This is a 57-year-old  lady with a known

seizure disorder since she was born per the patient's history.  She has had

seizures throughout her life, which sound to be petit like. She sees a

neurologist as an outpatient in the [facility].  She states that in the

last 6-12 months, she has been having more recurrent brief seizures lasting for

seconds to minutes and they are as frequent as once every month to every 2-3

months.  She apparently has been on Tegretol for a long time with no changes

being made in her dosage for a long time. She apparently had a

seizure yesterday while riding the bus.  She was brought into the ER where while

in the ER, she was noted to have a seizure lasting about a minute that was

described as a whole body tremors, but had no incontinence or no tongue biting. 

She was admitted to the tele [facility] and has been evaluated by Neurology.  The

patient feels okay at this time with no further seizures since admission.



PAST MEDICAL HISTORY:  As noted above.  Actually as a teenager, she had

diagnostics done, but she does not remember if there was anything noted and she

does not remember the diagnostic results or causes of the seizure.  Of note,

her Tegretol level was therapeutic at 8.7.



PAST SURGICAL HISTORY:  None.



FAMILY HISTORY:  Noncontributory.



SOCIAL HISTORY:  No tobacco, ETOH, or illicit drug usage.



ALLERGIES:  NKDA.



OUTPATIENT MEDICATIONS:  Carbamazepine 200 mg b.i.d.



REVIEW OF SYSTEMS:

CONSTITUTIONAL:  No fever or chills.  No recent weight loss.

CARDIAC:  No chest pain or palpitations.

PULMONARY:  No cough or phlegm production.

GASTROINTESTINAL:  No bowel habit changes.

GENITOURINARY:  No bladder habit changes including no urinary incontinence or no

loss of urine during the attack.

NEUROLOGIC:  Please refer to the HPI.



PHYSICAL EXAMINATION:

VITAL SIGNS:  Temperature 97.8, pulse 86, respirations 19, /80, and

satting 98% on room air.

GENERAL:  Well developed, well nourished, not in acute distress.  Awake, alert,

and oriented x3.

HEAD AND NECK:  Normocephalic, atraumatic.  Pupils reactive to light. 

Extraocular movements are intact.  Oropharynx moist and clear.

CARDIOVASCULAR:  Regular rate and rhythm without any murmurs.

LUNGS:  Clear to auscultation bilaterally.

ABDOMEN:  Soft, supple, nontender, nondistended, normoactive bowel sounds in

lower extremities.  No pedal edema.



LABORATORY DATA:  On admission, white count 10.8, H and H 13/38 with a platelet

count of 264.  Sodium 132, potassium 4.0, chloride 100, CO2 25, BUN 14,

creatinine 0.7, glucose 117, and total bilirubin 0.2.  LFTs were essentially

within normal limits.  UA was essentially within normal limits, carbamazepine

level 8.7.



DIAGNOSTICS:  None.



IMPRESSION:

1.  History of seizures with frequent breakthrough seizures.

2.  Mild hyponatremia.



PLAN:  The patient has been admitted to the medical floor where she has been

given Ativan p.r.n. and IV fluids.  The patient has been seen by Neurology who

has started Keppra 500 mg b.i.d. and is agreeable for the patient to be

discharged on Tegretol and Keppra.  I advised the patient to follow with her

primary care doctor and especially with her neurologist within a week for

further management and care.





DD: 01/03/2018 08:12

DT: 01/03/2018 08:48

JOB# [id number]  [id number]

MTDKIMBERLY

## 2018-01-05 NOTE — DISCHARGE SUMMARY
DATE OF DISCHARGE:  01/03/2018



DATE OF DISCHARGE:  01/03/2018.



ADMITTING DIAGNOSES:  Breakthrough seizure activity and mild hyponatremia.



SECONDARY DIAGNOSIS:  Long standing history of seizure disorder.



DISCHARGE DIAGNOSIS:  Breakthrough seizure disorder, stable.



CONSULTANTS:  Dr. Matos.



MAJOR PROCEDURES:  None.



DISCHARGE MEDICATIONS:  Keppra 500 mg b.i.d. and Tegretol 200 mg b.i.d.



BRIEF HOSPITAL COURSE:  This is a 57-year-old  lady with a longstanding

history of seizures since birth, who had a seizure yesterday while riding a bus.

 The seizures were described as a somewhat like petite mal where she just loses

some consciousness, but generally does not have tremors.  She was brought into

the ER where she had a one-minute seizure with body tremors and loss of

consciousness.  This was stabilized.  The patient was admitted to Med/Surg where

she received Ativan p.r.n. and was kept on her Tegretol as scheduled.  The

Tegretol level was 8.7, but nevertheless she was also placed on Keppra 500 mg

b.i.d. per neuro recommendation.  Since being admitted, she has had no seizure

activity or no aura of possible seizures.



CONDITION ON DISCHARGE:  Stable.



DISPOSITION:  The patient will be discharged home to self-care.  I advised the

patient to follow up with her primary care doctor within 2-3 days and to go back

to her outpatient neurologist within a week to which she is agreeable.





DD: 01/03/2018 08:15

DT: 01/03/2018 08:48

JOB# [id number]  [id number]

## 2021-02-22 ENCOUNTER — OFFICE (OUTPATIENT)
Dept: URBAN - METROPOLITAN AREA CLINIC 73 | Facility: CLINIC | Age: 61
End: 2021-02-22

## 2021-02-22 VITALS
WEIGHT: 123 LBS | HEIGHT: 66 IN | SYSTOLIC BLOOD PRESSURE: 121 MMHG | HEART RATE: 76 BPM | DIASTOLIC BLOOD PRESSURE: 83 MMHG | TEMPERATURE: 96.2 F

## 2021-02-22 DIAGNOSIS — K59.04 CHRONIC IDIOPATHIC CONSTIPATION: ICD-10-CM

## 2021-02-22 PROCEDURE — 99204 OFFICE O/P NEW MOD 45 MIN: CPT | Performed by: INTERNAL MEDICINE

## 2021-02-22 RX ORDER — LINACLOTIDE 145 UG/1
CAPSULE, GELATIN COATED ORAL
Qty: 60 | Status: ACTIVE
Start: 2021-02-22

## 2021-02-22 NOTE — SERVICEHPINOTES
This is a 60yoF with history of epilepsy who presents today for constipation. The patient reports that she suffers from chronic difficulty with spontaneous bowel movements. She has on average 1 BM every 7-8 days. Lately, she has felt that the rectum is "blocked" and that she is unable to fully empty her bowels. She also reports a sensation of prolapse/fullness in the perianal area associated with bowel movements. She denies any blood in the stool , and has not had a previous colonoscopy. SHe denies having tried any medications for this condition, though on review of her medication list she is supposed to be on Amitiza 24mcg daily.

## 2021-03-15 ENCOUNTER — OFFICE (OUTPATIENT)
Dept: URBAN - METROPOLITAN AREA CLINIC 73 | Facility: CLINIC | Age: 61
End: 2021-03-15

## 2021-03-15 VITALS
SYSTOLIC BLOOD PRESSURE: 122 MMHG | TEMPERATURE: 96.1 F | WEIGHT: 122 LBS | DIASTOLIC BLOOD PRESSURE: 78 MMHG | HEART RATE: 66 BPM | HEIGHT: 66 IN

## 2021-03-15 DIAGNOSIS — K59.04 CHRONIC IDIOPATHIC CONSTIPATION: ICD-10-CM

## 2021-03-15 PROCEDURE — 99213 OFFICE O/P EST LOW 20 MIN: CPT | Performed by: INTERNAL MEDICINE

## 2021-03-15 NOTE — SERVICEHPINOTES
This is a 60yoF with history of epilepsy who presents today for follow-up of constipation. The patient reports that she suffers from chronic difficulty with spontaneous bowel movements. She has on average 1 BM every 7-8 days.  She was first seen in the office about 3 weeks ago, at that time with some complains also of anal rectal "blockage" and symptoms of potentially constipation related to pelvic muscle dysfunction.  She was prescribed Linzess 145 mcg well labs are sent to rule out secondary causes of slow transit constipation.  Had declined any endoscopic exam or rectal examination at the time of her last visit.She presents today for follow-up and reports that after having tried Linzess 145 mcg daily, her symptoms did not improve at all.  She continues to have 1 bowel movement every 7 to 8 days.  She expressed frustration at the lack of efficacy of the medication and that physicians may be "experimenting" on her.  She reports having tried a powder medication which was prescribed by physicians in [place], this seems to have helped her symptoms though she did not bring this medicine and I do not know what the name of the drug is.

## 2021-03-15 NOTE — SERVICENOTES
30 minutes were spent discussing lab results, options and risks of potential procedures with the patient as well as documenting.

## 2022-04-22 ENCOUNTER — OFFICE (OUTPATIENT)
Dept: URBAN - METROPOLITAN AREA CLINIC 73 | Facility: CLINIC | Age: 62
End: 2022-04-22

## 2022-04-22 VITALS
HEART RATE: 71 BPM | WEIGHT: 120 LBS | TEMPERATURE: 97.1 F | HEIGHT: 66 IN | DIASTOLIC BLOOD PRESSURE: 74 MMHG | SYSTOLIC BLOOD PRESSURE: 113 MMHG

## 2022-04-22 DIAGNOSIS — K59.04 CHRONIC IDIOPATHIC CONSTIPATION: ICD-10-CM

## 2022-04-22 PROCEDURE — 99214 OFFICE O/P EST MOD 30 MIN: CPT | Performed by: INTERNAL MEDICINE

## 2022-04-22 RX ORDER — LACTULOSE 10 G/15ML
SOLUTION ORAL; RECTAL
Qty: 1800 | Status: ACTIVE
Start: 2022-04-22

## 2022-04-22 NOTE — SERVICEHPINOTES
This is a 61yoF with history of epilepsy who was previously seen in this office, last seen 1 year ago, who presents again with similar issues as before with a chief complaint of constipation.
aura Paniagua patient again reports having difficulty with infrequent stooling. She reports having a spontaneous bowel movement once every 2 weeks. Stools are hard and difficult to pass and it feels like "something is blocking" the colon.  She also describes sensation of incomplete emptying, and though she denies any issues with obstipation, recurrent nausea vomiting or weight loss. There is also no blood in the stool.
aura Paniagua patient has been advised to start Linzess for chronic idiopathic constipation in the past but has been nonadherent. She tells me that she has been taking a fiber supplement out of a "brown bottle" that has been helping her. However, she does not take this on a daily basis. She waits about 2 weeks, takes the fiber, after which she will produce satisfactory bowel movement. She is reluctant to try any other laxatives or stool softeners.
aura Barry has never had a colonoscopy in the past.

## 2023-05-15 ENCOUNTER — OFFICE (OUTPATIENT)
Dept: URBAN - METROPOLITAN AREA CLINIC 73 | Facility: CLINIC | Age: 63
End: 2023-05-15

## 2023-05-15 VITALS
WEIGHT: 113 LBS | HEIGHT: 66 IN | DIASTOLIC BLOOD PRESSURE: 83 MMHG | SYSTOLIC BLOOD PRESSURE: 113 MMHG | TEMPERATURE: 98.4 F | HEART RATE: 81 BPM

## 2023-05-15 DIAGNOSIS — K59.04 CHRONIC IDIOPATHIC CONSTIPATION: ICD-10-CM

## 2023-05-15 DIAGNOSIS — R10.30 LOWER ABDOMINAL PAIN: ICD-10-CM

## 2023-05-15 PROCEDURE — 99214 OFFICE O/P EST MOD 30 MIN: CPT | Performed by: INTERNAL MEDICINE

## 2023-05-15 NOTE — SERVICEHPINOTES
Patient is here for follow-up for constipation and lower abdominal pain.  Patient was previously seeing a month ago with Dr. Connie Cantu she was prescribed lactulose at that time but her constipation is still severe. Previously she did not respond to Amitiza and Linzess.  She still complaining of low abdominal pain as well..  She was previously hesitant but now is willing to undergo a screening colonoscopy.  She would like to try another medication for the constipation.br
br A/P:
br
br #Chronic constipation: Given nonresponse to Amitiza, lactulose, as well as Linzess we will give patient samples IBSrela.  If she has not responded we will send in a prescription.br
br #Low abdominal pain: We will for patient for a colonoscopy for low abdominal pain as well as for screening.